# Patient Record
Sex: FEMALE | Race: WHITE | NOT HISPANIC OR LATINO | URBAN - METROPOLITAN AREA
[De-identification: names, ages, dates, MRNs, and addresses within clinical notes are randomized per-mention and may not be internally consistent; named-entity substitution may affect disease eponyms.]

---

## 2021-05-14 DIAGNOSIS — Z13.6 CARDIOVASCULAR SCREENING; LDL GOAL LESS THAN 100: Primary | ICD-10-CM

## 2021-05-20 ENCOUNTER — OFFICE VISIT (OUTPATIENT)
Dept: CARDIOLOGY | Facility: CLINIC | Age: 51
End: 2021-05-20
Payer: COMMERCIAL

## 2021-05-20 VITALS
HEART RATE: 68 BPM | TEMPERATURE: 95 F | DIASTOLIC BLOOD PRESSURE: 80 MMHG | SYSTOLIC BLOOD PRESSURE: 127 MMHG | HEIGHT: 72 IN | BODY MASS INDEX: 26.44 KG/M2 | WEIGHT: 195.2 LBS

## 2021-05-20 DIAGNOSIS — Z13.6 SCREENING FOR HEART DISEASE: Primary | ICD-10-CM

## 2021-05-20 DIAGNOSIS — Z13.6 CARDIOVASCULAR SCREENING; LDL GOAL LESS THAN 100: ICD-10-CM

## 2021-05-20 LAB
CHOLEST SERPL-MCNC: 152 MG/DL
CREAT UR-MCNC: 33 MG/DL
CRP SERPL HS-MCNC: 0.3 MG/L
GLUCOSE SERPL-MCNC: 88 MG/DL (ref 70–99)
HDLC SERPL-MCNC: 60 MG/DL
INTERPRETATION ECG - MUSE: NORMAL
LDLC SERPL CALC-MCNC: 82 MG/DL
MICROALBUMIN UR-MCNC: <5 MG/L
MICROALBUMIN/CREAT UR: NORMAL MG/G CR (ref 0–25)
NONHDLC SERPL-MCNC: 92 MG/DL
NT-PROBNP SERPL-MCNC: 65 PG/ML (ref 0–125)
TRIGL SERPL-MCNC: 50 MG/DL

## 2021-05-20 PROCEDURE — 36415 COLL VENOUS BLD VENIPUNCTURE: CPT | Performed by: PATHOLOGY

## 2021-05-20 PROCEDURE — 80061 LIPID PANEL: CPT | Performed by: PATHOLOGY

## 2021-05-20 PROCEDURE — 82947 ASSAY GLUCOSE BLOOD QUANT: CPT | Performed by: PATHOLOGY

## 2021-05-20 PROCEDURE — 99000 SPECIMEN HANDLING OFFICE-LAB: CPT | Performed by: PATHOLOGY

## 2021-05-20 PROCEDURE — 86141 C-REACTIVE PROTEIN HS: CPT | Mod: 90 | Performed by: PATHOLOGY

## 2021-05-20 PROCEDURE — 82043 UR ALBUMIN QUANTITATIVE: CPT | Performed by: PATHOLOGY

## 2021-05-20 PROCEDURE — 83880 ASSAY OF NATRIURETIC PEPTIDE: CPT | Performed by: PATHOLOGY

## 2021-05-20 PROCEDURE — 93922 UPR/L XTREMITY ART 2 LEVELS: CPT | Performed by: NURSE PRACTITIONER

## 2021-05-20 PROCEDURE — 99215 OFFICE O/P EST HI 40 MIN: CPT | Mod: 25 | Performed by: NURSE PRACTITIONER

## 2021-05-20 RX ORDER — PROGESTERONE 100 MG/1
100 CAPSULE ORAL
COMMUNITY
Start: 2021-04-24

## 2021-05-20 RX ORDER — ESTRADIOL 0.05 MG/D
0.05 PATCH, EXTENDED RELEASE TRANSDERMAL
COMMUNITY
Start: 2021-04-23

## 2021-05-20 ASSESSMENT — MIFFLIN-ST. JEOR: SCORE: 1617.42

## 2021-05-20 NOTE — PROGRESS NOTES
[facility] for Cardiovascular Disease Prevention - Exam Note    Active Problems   There are no active problems to display for this patient.      Reason For Visit   Patient here for [facility] early detection of atherosclerosis and CVD exam.    Pain Evaluation  Current history of pain associated with this visit is: denied    HPI   Nissa Price is a 50 year old year old female with a strong family history of heart disease, sleep apnea, and dermatitis.  She has had neck pain and sees a chiropractor and has acupuncture.  She uses CPAP nightly.  Her primary care provider is Maite Toro at [facility] in [place].  She was referred to our clinic by the Menopause clinic.  Today in clinic she states that she has a flutter in her chest approximately 1x/month for a few seconds and occasional heartburn.  She states that she has slight recall issues that could be related to extra stress with COVID19.  She denies chest pain/pressure at rest or with activity or while sleeping, dizziness, SOB at rest, with activity or while sleeping, lower leg edema, or calf pain.    Nutrition assessment per patient report:   Foods with fat/cholesterol (fried foods, fatty meats, junk food):  eats red meat daily, grew up with lots of red meat, her dad was a cattle    Fruits and vegetables (  cup cooked, 1 cup raw):  2 servings of vegetables/day, 1 serving of fruit/day  Caffeine (1 cup coffee, soda, etc):  2 cups of coffee/day  Alcohol servings (12 oz. beer, 4 oz. wine, 1  oz. in mixed drink):  2 glasses of wine/night  Calcium servings (dairy foods, 8 oz. milk, yogurt, cheese, ice cream):  Greek yogurt 2x/week  Salt/sodium use:  moderate  Special dietary habits:  None    Activity  Patient is not active. 2 years ago she was very active with a body pump class, and yoga 1x/week.  She played volleyball and basketball in college.    Laboratory Results Review  We discussed laboratory results today including lipids  targets and how foods influence cholesterol.    Weight  Her perceived healthy weight is  185-190 pounds.  A normal BMI of 25 is equal to 184 pounds.  The current BMI of 26.47 is overweight range.     Past Medical History:   Diagnosis Date     Dermatitis      TRESA (obstructive sleep apnea)      Vertigo        PSH  No past surgical history on file.    Current Meds   Current Outpatient Medications   Medication Sig Dispense Refill     estradiol (VIVELLE-DOT) 0.05 MG/24HR bi-weekly patch 0.05 patches         Allergies    Not on File    Family Hx   Family History   Problem Relation Age of Onset     Hypertension Mother      Hyperlipidemia Mother      Coronary Artery Disease Mother         2 stents age 72     Breast Cancer Mother         anjum mastectomy     Skin Cancer Mother         squamous cell     Hypertension Father      Atrial fibrillation Father      Hyperlipidemia Father      Skin Cancer Father         squamous cell     Sleep Apnea Father         CPAP     Abdominal Aortic Aneurysm Father         Monitor     Cancer Sister         left temple     Lymphoma Maternal Grandmother         age 50     Acute lymphoblastic leukemia Maternal Grandmother      Thyroid Disease Maternal Grandmother         Goiter     Coronary Artery Disease Maternal Grandfather         s/p CABG at age 70     Hypertension Maternal Grandfather      Lung Cancer Maternal Grandfather         Smoker     Other - See Comments Paternal Grandmother         MVA     Coronary Artery Disease Paternal Grandmother         s/p CABG in  mid 60s     Heart Disease Sister         VSD     Ankylosing Spondylitis Sister         joint pain     Alzheimer Disease Paternal Grandfather      Atrial fibrillation Paternal Grandfather      Prostate Cancer Paternal Grandfather        Social History  Nissa is  at [employer] is working full time. Her job is semi-active.  She is  2 children. She used to be a y  before becoming an  .     Enjoyment of life is 8 with 10 being enjoys life fully.    Tobacco History  History   Smoking Status     Never Smoker   Smokeless Tobacco     Not on file       ROS  CONSTITUTIONAL:  No fever, chills, or sweats. No weight gain/loss.   EENT:  No visual disturbance, ear ache, epistaxis, sore throat  ALLERGIES/IMMUNOLOGIC:  Negative  RESPIRATORY:  No cough, hemoptysis  CARDIOVASCULAR:  As per HPI  GI:  No nausea, vomiting, hematemesis, melena  :  No urinary frequency, dysuria, or hematuria  INTEGUMENT:  Negative  PSYCHIATRIC:  Negative  NEURO:  Negative  ENDOCRINE:  Negative  MUSCULOSKELETAL:  Negative     Vital Signs   There were no vitals taken for this visit.      Waist: 32 inches  Hip: 42 inches    Physical Exam   In general, the patient is a pleasant female in no apparent distress   HEENT: NC/AT, PERRLA, EOMI, sclerae white, not injected. Nares clear, pharynx without erythema or exudate, dentition intact    Neck: No adenopathy, no thyromegaly, carotids +4/4 bilaterally without bruits,  no jugular venous distension   Lungs: Breath sounds clear bilaterally, without crackles, ronchi, or wheezes  Cor: RRR, S1S2 without murmur, rub, click, or gallop, the PMI is in the 5th ICS in the midclavicular line  Abdomen: Soft, nontender, nondistended, BS+ in all 4 quadrants, without hepatomegaly, no aorta or renal artery bruits  Extremities: No clubbing, cyanosis, or edema. DP and PT pulses +2/2 bilaterally    The 10-year ASCVD risk score (Carpinteria MADDISON Jr., et al., 2013) is: 0.8%  Values used to calculate the score:   Age: 50 year old   Sex: female   Is Non- : No   Diabetic: No   Tobacco smoker: No   Systolic Blood Press: 129 mmHg   Is BP treated: No   HDL Cholesterol: 60 mg/dL   Total Cholesterol: 152  mg/dL    Recent Labs  Lab Results   Component Value Date    GLC 88 05/20/2021      Lab Results   Component Value Date    NTBNP 65 05/20/2021     No results found for: NTBNPI   Lab  Results   Component Value Date    UCRR 33 05/20/2021      Lab Results   Component Value Date    MICROL <5 05/20/2021      No results found for: MICROALBUMIN   No results found for: CRP   Lab Results   Component Value Date    CHOL 152 05/20/2021      Lab Results   Component Value Date    TRIG 50 05/20/2021      Lab Results   Component Value Date    HDL 60 05/20/2021      Lab Results   Component Value Date    LDL 82 05/20/2021      No results found for: VLDL   No results found for: CHOLHDLRATIO  Lab Results   Component Value Date    NHDL 92 05/20/2021        Assessment:     Cardiovascular:  Asymptomatic, currently not complainig of chest pain. She has a strong family history of heart disase on both side of her family, no other cardiac risk factors.    Blood Pressure:  Well controlled, 127-129/79-80, she does not take BP medication    Lipids:  She does not take a statin medicaton  !LIPID/HEPATIC Latest Ref Rng & Units 5/20/2021   CHOLESTEROL <200 mg/dL 152   TRIGLYCERIDES <150 mg/dL 50   HDL CHOLESTEROL >49 mg/dL 60   LDL CHOLESTEROL, CALCULATED <100 mg/dL 82   NON HDL CHOLESTEROL <130 mg/dL 92       Glucose: 88    Weight Management: BMI 26.47, athletic body type    Return to Clinic: 5 years    Health Habit Summary:  Nutrition: Heart Healthy Eating:  most of the time   Exercise:  not regularly active  Weight:  overweight range  Tobacco Use:  never used    Full report to follow prevention team review of test results with scanned final report.    Time spent for patient visit was 60 minutes with more than half the time spent on counseling and coordination of care.    ANNAMARIA Cooper CNP       CC  Patient Care Team:  Maite Toro MD as PCP - General (Family Medicine)  Priscilla Sanches APRN CNP as Nurse Practitioner (Cardiovascular Disease)  SELF, REFERRED

## 2021-05-20 NOTE — LETTER
5/20/2021      RE: Nissa Price  [address] [facility] [address]       Dear Colleague,    Thank you for the opportunity to participate in the care of your patient, Nissa Price, at the [facility] [place] at [facility]. Please see a copy of my visit note below.      [facility] for Cardiovascular Disease Prevention - Exam Note    Active Problems   There are no active problems to display for this patient.      Reason For Visit   Patient here for [facility] early detection of atherosclerosis and CVD exam.    Pain Evaluation  Current history of pain associated with this visit is: denied    HPI   Nissa Price is a 50 year old year old female with a strong family history of heart disease, sleep apnea, and dermatitis.  She has had neck pain and sees a chiropractor and has acupuncture.  She uses CPAP nightly.  Her primary care provider is Maite Toro at [facility] in [place].  She was referred to our clinic by the Menopause clinic.  Today in clinic she states that she has a flutter in her chest approximately 1x/month for a few seconds and occasional heartburn.  She states that she has slight recall issues that could be related to extra stress with COVID19.  She denies chest pain/pressure at rest or with activity or while sleeping, dizziness, SOB at rest, with activity or while sleeping, lower leg edema, or calf pain.    Nutrition assessment per patient report:   Foods with fat/cholesterol (fried foods, fatty meats, junk food):  eats red meat daily, grew up with lots of red meat, her dad was a cattle    Fruits and vegetables (  cup cooked, 1 cup raw):  2 servings of vegetables/day, 1 serving of fruit/day  Caffeine (1 cup coffee, soda, etc):  2 cups of coffee/day  Alcohol servings (12 oz. beer, 4 oz. wine, 1  oz. in mixed drink):  2 glasses of  wine/night  Calcium servings (dairy foods, 8 oz. milk, yogurt, cheese, ice cream):  Greek yogurt 2x/week  Salt/sodium use:  moderate  Special dietary habits:  None    Activity  Patient is not active. 2 years ago she was very active with a body pump class, and yoga 1x/week.  She played volleyball and basketball in college.    Laboratory Results Review  We discussed laboratory results today including lipids targets and how foods influence cholesterol.    Weight  Her perceived healthy weight is  185-190 pounds.  A normal BMI of 25 is equal to 184 pounds.  The current BMI of 26.47 is overweight range.     Past Medical History:   Diagnosis Date     Dermatitis      TRESA (obstructive sleep apnea)      Vertigo        PSH  No past surgical history on file.    Current Meds   Current Outpatient Medications   Medication Sig Dispense Refill     estradiol (VIVELLE-DOT) 0.05 MG/24HR bi-weekly patch 0.05 patches         Allergies    Not on File    Family Hx   Family History   Problem Relation Age of Onset     Hypertension Mother      Hyperlipidemia Mother      Coronary Artery Disease Mother         2 stents age 72     Breast Cancer Mother         anjum mastectomy     Skin Cancer Mother         squamous cell     Hypertension Father      Atrial fibrillation Father      Hyperlipidemia Father      Skin Cancer Father         squamous cell     Sleep Apnea Father         CPAP     Abdominal Aortic Aneurysm Father         Monitor     Cancer Sister         left temple     Lymphoma Maternal Grandmother         age 50     Acute lymphoblastic leukemia Maternal Grandmother      Thyroid Disease Maternal Grandmother         Goiter     Coronary Artery Disease Maternal Grandfather         s/p CABG at age 70     Hypertension Maternal Grandfather      Lung Cancer Maternal Grandfather         Smoker     Other - See Comments Paternal Grandmother         MVA     Coronary Artery Disease Paternal Grandmother         s/p CABG in  mid 60s     Heart Disease  Sister         VSD     Ankylosing Spondylitis Sister         joint pain     Alzheimer Disease Paternal Grandfather      Atrial fibrillation Paternal Grandfather      Prostate Cancer Paternal Grandfather        Social History  Nissa is  at [employer]and is working full time. Her job is semi-active.  She is  2 children. She used to be a [employer]  before becoming an .     Enjoyment of life is 8 with 10 being enjoys life fully.    Tobacco History  History   Smoking Status     Never Smoker   Smokeless Tobacco     Not on file       ROS  CONSTITUTIONAL:  No fever, chills, or sweats. No weight gain/loss.   EENT:  No visual disturbance, ear ache, epistaxis, sore throat  ALLERGIES/IMMUNOLOGIC:  Negative  RESPIRATORY:  No cough, hemoptysis  CARDIOVASCULAR:  As per HPI  GI:  No nausea, vomiting, hematemesis, melena  :  No urinary frequency, dysuria, or hematuria  INTEGUMENT:  Negative  PSYCHIATRIC:  Negative  NEURO:  Negative  ENDOCRINE:  Negative  MUSCULOSKELETAL:  Negative     Vital Signs   There were no vitals taken for this visit.      Waist: 32 inches  Hip: 42 inches    Physical Exam   In general, the patient is a pleasant female in no apparent distress   HEENT: NC/AT, PERRLA, EOMI, sclerae white, not injected. Nares clear, pharynx without erythema or exudate, dentition intact    Neck: No adenopathy, no thyromegaly, carotids +4/4 bilaterally without bruits,  no jugular venous distension   Lungs: Breath sounds clear bilaterally, without crackles, ronchi, or wheezes  Cor: RRR, S1S2 without murmur, rub, click, or gallop, the PMI is in the 5th ICS in the midclavicular line  Abdomen: Soft, nontender, nondistended, BS+ in all 4 quadrants, without hepatomegaly, no aorta or renal artery bruits  Extremities: No clubbing, cyanosis, or edema. DP and PT pulses +2/2 bilaterally    The 10-year ASCVD risk score (Tawanda MADDISON Jr., et al., 2013) is: 0.8%  Values used to calculate  the score:   Age: 50 year old   Sex: female   Is Non- : No   Diabetic: No   Tobacco smoker: No   Systolic Blood Press: 129 mmHg   Is BP treated: No   HDL Cholesterol: 60 mg/dL   Total Cholesterol: 152  mg/dL    Recent Labs  Lab Results   Component Value Date    GLC 88 05/20/2021      Lab Results   Component Value Date    NTBNP 65 05/20/2021     No results found for: NTBNPI   Lab Results   Component Value Date    UCRR 33 05/20/2021      Lab Results   Component Value Date    MICROL <5 05/20/2021      No results found for: MICROALBUMIN   No results found for: CRP   Lab Results   Component Value Date    CHOL 152 05/20/2021      Lab Results   Component Value Date    TRIG 50 05/20/2021      Lab Results   Component Value Date    HDL 60 05/20/2021      Lab Results   Component Value Date    LDL 82 05/20/2021      No results found for: VLDL   No results found for: CHOLHDLRATIO  Lab Results   Component Value Date    NHDL 92 05/20/2021        Assessment:     Cardiovascular:  Asymptomatic, currently not complainig of chest pain. She has a strong family history of heart disase on both side of her family, no other cardiac risk factors.    Blood Pressure:  Well controlled, 127-129/79-80, she does not take BP medication    Lipids:  She does not take a statin medicaton  !LIPID/HEPATIC Latest Ref Rng & Units 5/20/2021   CHOLESTEROL <200 mg/dL 152   TRIGLYCERIDES <150 mg/dL 50   HDL CHOLESTEROL >49 mg/dL 60   LDL CHOLESTEROL, CALCULATED <100 mg/dL 82   NON HDL CHOLESTEROL <130 mg/dL 92       Glucose: 88    Weight Management: BMI 26.47, athletic body type    Return to Clinic: 5 years    Health Habit Summary:  Nutrition: Heart Healthy Eating:  most of the time   Exercise:  not regularly active  Weight:  overweight range  Tobacco Use:  never used    Full report to follow prevention team review of test results with scanned final report.    Time spent for patient visit was 60 minutes with more than half the time  spent on counseling and coordination of care.    ANNAMARIA Cooper CNP       CC  Patient Care Team:  Maite Toro MD as PCP - General (Family Medicine)  Myron, ANNAMARIA Porras CNP as Nurse Practitioner (Cardiovascular Disease)  SELF, REFERRED    [facility] Test Results    WALKING BLOOD PRESSURE RESPONSE (3 minute, 5 MET level walk)   Pre BP: 120/82 mmHg  3 min BP: 140/66 mmHg  1 min post BP: 120/70 mmHg    Pre HR: 69 bpm  3 min HR: 121 bpm  1 min post HR: 70 bpm       ABDOMINAL AORTA ULTRASOUND (< 2.5 normal, borderline 2.5-2.9, abnormal > 3)   SupraIliac 1.90 cm    SupraRenal 2.0 cm    InfraRenal Proximal 1.60 cm    InfraRenal Distal 1.90 cm      Abdominal Aorta Assessment:  normal    LEFT VENTRICULAR ULTRASOUND MEASUREMENTS (adjusted for BSA)  LVIDD 47.50 mm   Septa 8.8 mm   Posterior 8.5 mm     Left Ventricular US Assessment:  normal    Carotid Artery IMT measurements report and plaques in the small area examined:   Left IMT 0.576 mm  Plaques none    Right IMT 0.499 mm  Plaques none     ECG (see tracing):  normal sinus rhythm;  rate: 63 bpm    Arterial Elasticity per age and gender (see printout):   C1 16.2 mL/mmHg x 10  normal   C2 8.4 mL/mmHg x 100 normal   Supine blood pressure: 128/73 mmHg     Denae Burroughs      Please do not hesitate to contact me if you have any questions/concerns.     Sincerely,     ANNAMARIA Cooper CNP

## 2021-05-24 NOTE — PROGRESS NOTES
[facility] Test Results    WALKING BLOOD PRESSURE RESPONSE (3 minute, 5 MET level walk)   Pre BP: 120/82 mmHg  3 min BP: 140/66 mmHg  1 min post BP: 120/70 mmHg    Pre HR: 69 bpm  3 min HR: 121 bpm  1 min post HR: 70 bpm       ABDOMINAL AORTA ULTRASOUND (< 2.5 normal, borderline 2.5-2.9, abnormal > 3)   SupraIliac 1.90 cm    SupraRenal 2.0 cm    InfraRenal Proximal 1.60 cm    InfraRenal Distal 1.90 cm      Abdominal Aorta Assessment:  normal    LEFT VENTRICULAR ULTRASOUND MEASUREMENTS (adjusted for BSA)  LVIDD 47.50 mm   Septa 8.8 mm   Posterior 8.5 mm     Left Ventricular US Assessment:  normal    Carotid Artery IMT measurements report and plaques in the small area examined:   Left IMT 0.576 mm  Plaques none    Right IMT 0.499 mm  Plaques none     ECG (see tracing):  normal sinus rhythm;  rate: 63 bpm    Arterial Elasticity per age and gender (see printout):   C1 16.2 mL/mmHg x 10  normal   C2 8.4 mL/mmHg x 100 normal   Supine blood pressure: 128/73 mmHg     Denae Burroughs

## 2021-06-27 ENCOUNTER — HEALTH MAINTENANCE LETTER (OUTPATIENT)
Age: 51
End: 2021-06-27

## 2021-07-06 ENCOUNTER — ANCILLARY PROCEDURE (OUTPATIENT)
Dept: CT IMAGING | Facility: CLINIC | Age: 51
End: 2021-07-06
Attending: NURSE PRACTITIONER

## 2021-07-06 DIAGNOSIS — Z13.6 SCREENING FOR HEART DISEASE: ICD-10-CM

## 2021-07-06 PROCEDURE — 75571 CT HRT W/O DYE W/CA TEST: CPT | Performed by: STUDENT IN AN ORGANIZED HEALTH CARE EDUCATION/TRAINING PROGRAM

## 2021-10-17 ENCOUNTER — HEALTH MAINTENANCE LETTER (OUTPATIENT)
Age: 51
End: 2021-10-17

## 2022-07-24 ENCOUNTER — HEALTH MAINTENANCE LETTER (OUTPATIENT)
Age: 52
End: 2022-07-24

## 2022-10-03 ENCOUNTER — HEALTH MAINTENANCE LETTER (OUTPATIENT)
Age: 52
End: 2022-10-03

## 2023-08-12 ENCOUNTER — HEALTH MAINTENANCE LETTER (OUTPATIENT)
Age: 53
End: 2023-08-12